# Patient Record
Sex: MALE | Race: ASIAN | NOT HISPANIC OR LATINO | URBAN - METROPOLITAN AREA
[De-identification: names, ages, dates, MRNs, and addresses within clinical notes are randomized per-mention and may not be internally consistent; named-entity substitution may affect disease eponyms.]

---

## 2017-03-06 ENCOUNTER — OUTPATIENT (OUTPATIENT)
Dept: OUTPATIENT SERVICES | Facility: HOSPITAL | Age: 52
LOS: 1 days | End: 2017-03-06

## 2017-03-06 ENCOUNTER — APPOINTMENT (OUTPATIENT)
Dept: INTERNAL MEDICINE | Facility: HOSPITAL | Age: 52
End: 2017-03-06

## 2017-03-06 VITALS — SYSTOLIC BLOOD PRESSURE: 110 MMHG | HEART RATE: 76 BPM | DIASTOLIC BLOOD PRESSURE: 80 MMHG

## 2017-03-06 VITALS — HEIGHT: 66 IN | BODY MASS INDEX: 27 KG/M2 | WEIGHT: 168 LBS

## 2017-03-07 DIAGNOSIS — E85.8 OTHER AMYLOIDOSIS: ICD-10-CM

## 2017-03-07 DIAGNOSIS — G47.00 INSOMNIA, UNSPECIFIED: ICD-10-CM

## 2017-03-07 DIAGNOSIS — E55.9 VITAMIN D DEFICIENCY, UNSPECIFIED: ICD-10-CM

## 2017-03-07 DIAGNOSIS — R14.1 GAS PAIN: ICD-10-CM

## 2017-03-07 DIAGNOSIS — K59.09 OTHER CONSTIPATION: ICD-10-CM

## 2017-03-08 DIAGNOSIS — M19.90 UNSPECIFIED OSTEOARTHRITIS, UNSPECIFIED SITE: ICD-10-CM

## 2017-06-19 ENCOUNTER — APPOINTMENT (OUTPATIENT)
Dept: INTERNAL MEDICINE | Facility: HOSPITAL | Age: 52
End: 2017-06-19

## 2017-06-19 ENCOUNTER — LABORATORY RESULT (OUTPATIENT)
Age: 52
End: 2017-06-19

## 2017-06-19 ENCOUNTER — OUTPATIENT (OUTPATIENT)
Dept: OUTPATIENT SERVICES | Facility: HOSPITAL | Age: 52
LOS: 1 days | End: 2017-06-19

## 2017-06-19 VITALS — WEIGHT: 167 LBS | HEIGHT: 66 IN | BODY MASS INDEX: 26.84 KG/M2

## 2017-06-19 DIAGNOSIS — Z82.49 FAMILY HISTORY OF ISCHEMIC HEART DISEASE AND OTHER DISEASES OF THE CIRCULATORY SYSTEM: ICD-10-CM

## 2017-06-19 LAB
CHOLEST SERPL-MCNC: 219 MG/DL — HIGH (ref 120–199)
CRP SERPL-MCNC: 2.8 MG/L — SIGNIFICANT CHANGE UP (ref 0.3–5)
HBA1C BLD-MCNC: 6.3 % — HIGH (ref 4–5.6)
HDLC SERPL-MCNC: 43 MG/DL — SIGNIFICANT CHANGE UP (ref 35–55)
LIPID PNL WITH DIRECT LDL SERPL: 165 MG/DL — SIGNIFICANT CHANGE UP
TRIGL SERPL-MCNC: 103 MG/DL — SIGNIFICANT CHANGE UP (ref 10–149)
TSH SERPL-MCNC: 1.01 UIU/ML — SIGNIFICANT CHANGE UP (ref 0.27–4.2)

## 2017-06-20 VITALS — SYSTOLIC BLOOD PRESSURE: 120 MMHG | DIASTOLIC BLOOD PRESSURE: 80 MMHG | HEART RATE: 80 BPM

## 2017-06-20 DIAGNOSIS — Z82.49 FAMILY HISTORY OF ISCHEMIC HEART DISEASE AND OTHER DISEASES OF THE CIRCULATORY SYSTEM: ICD-10-CM

## 2017-06-20 DIAGNOSIS — G47.00 INSOMNIA, UNSPECIFIED: ICD-10-CM

## 2017-06-20 DIAGNOSIS — R07.9 CHEST PAIN, UNSPECIFIED: ICD-10-CM

## 2017-06-20 DIAGNOSIS — K59.09 OTHER CONSTIPATION: ICD-10-CM

## 2017-06-20 DIAGNOSIS — R14.1 GAS PAIN: ICD-10-CM

## 2017-06-20 DIAGNOSIS — M19.90 UNSPECIFIED OSTEOARTHRITIS, UNSPECIFIED SITE: ICD-10-CM

## 2017-06-20 DIAGNOSIS — E55.9 VITAMIN D DEFICIENCY, UNSPECIFIED: ICD-10-CM

## 2017-06-20 DIAGNOSIS — E85.8 OTHER AMYLOIDOSIS: ICD-10-CM

## 2017-06-21 LAB — 24R-OH-CALCIDIOL SERPL-MCNC: 14.8 NG/ML — LOW (ref 30–100)

## 2017-07-05 ENCOUNTER — APPOINTMENT (OUTPATIENT)
Dept: CV DIAGNOSTICS | Facility: HOSPITAL | Age: 52
End: 2017-07-05

## 2017-08-28 ENCOUNTER — OUTPATIENT (OUTPATIENT)
Dept: OUTPATIENT SERVICES | Facility: HOSPITAL | Age: 52
LOS: 1 days | End: 2017-08-28

## 2017-08-28 ENCOUNTER — APPOINTMENT (OUTPATIENT)
Dept: INTERNAL MEDICINE | Facility: HOSPITAL | Age: 52
End: 2017-08-28
Payer: MEDICAID

## 2017-08-28 VITALS — SYSTOLIC BLOOD PRESSURE: 122 MMHG | DIASTOLIC BLOOD PRESSURE: 88 MMHG | HEART RATE: 68 BPM | OXYGEN SATURATION: 96 %

## 2017-08-28 VITALS — WEIGHT: 170 LBS | HEIGHT: 66 IN | BODY MASS INDEX: 27.32 KG/M2

## 2017-08-28 DIAGNOSIS — Z23 ENCOUNTER FOR IMMUNIZATION: ICD-10-CM

## 2017-08-28 DIAGNOSIS — E85.8: ICD-10-CM

## 2017-08-28 DIAGNOSIS — G47.00 INSOMNIA, UNSPECIFIED: ICD-10-CM

## 2017-08-28 PROCEDURE — 99214 OFFICE O/P EST MOD 30 MIN: CPT | Mod: GC

## 2017-08-30 DIAGNOSIS — R07.9 CHEST PAIN, UNSPECIFIED: ICD-10-CM

## 2017-08-30 DIAGNOSIS — R73.03 PREDIABETES: ICD-10-CM

## 2017-08-30 DIAGNOSIS — E85.8 OTHER AMYLOIDOSIS: ICD-10-CM

## 2017-08-30 DIAGNOSIS — G47.00 INSOMNIA, UNSPECIFIED: ICD-10-CM

## 2017-08-30 DIAGNOSIS — M19.90 UNSPECIFIED OSTEOARTHRITIS, UNSPECIFIED SITE: ICD-10-CM

## 2017-08-30 DIAGNOSIS — K59.09 OTHER CONSTIPATION: ICD-10-CM

## 2017-08-30 DIAGNOSIS — E55.9 VITAMIN D DEFICIENCY, UNSPECIFIED: ICD-10-CM

## 2017-09-14 ENCOUNTER — OUTPATIENT (OUTPATIENT)
Dept: OUTPATIENT SERVICES | Facility: HOSPITAL | Age: 52
LOS: 1 days | End: 2017-09-14

## 2017-09-14 ENCOUNTER — APPOINTMENT (OUTPATIENT)
Dept: OBGYN | Facility: HOSPITAL | Age: 52
End: 2017-09-14

## 2017-09-15 DIAGNOSIS — R73.03 PREDIABETES: ICD-10-CM

## 2017-12-11 ENCOUNTER — OUTPATIENT (OUTPATIENT)
Dept: OUTPATIENT SERVICES | Facility: HOSPITAL | Age: 52
LOS: 1 days | End: 2017-12-11

## 2017-12-11 ENCOUNTER — LABORATORY RESULT (OUTPATIENT)
Age: 52
End: 2017-12-11

## 2017-12-11 ENCOUNTER — APPOINTMENT (OUTPATIENT)
Dept: INTERNAL MEDICINE | Facility: HOSPITAL | Age: 52
End: 2017-12-11
Payer: MEDICAID

## 2017-12-11 VITALS — SYSTOLIC BLOOD PRESSURE: 128 MMHG | DIASTOLIC BLOOD PRESSURE: 80 MMHG | HEART RATE: 80 BPM

## 2017-12-11 VITALS — HEIGHT: 66 IN | WEIGHT: 168.54 LBS | BODY MASS INDEX: 27.09 KG/M2

## 2017-12-11 DIAGNOSIS — J30.2 OTHER SEASONAL ALLERGIC RHINITIS: ICD-10-CM

## 2017-12-11 DIAGNOSIS — Z87.898 PERSONAL HISTORY OF OTHER SPECIFIED CONDITIONS: ICD-10-CM

## 2017-12-11 LAB
APPEARANCE UR: CLEAR — SIGNIFICANT CHANGE UP
BILIRUB UR-MCNC: NEGATIVE — SIGNIFICANT CHANGE UP
BLOOD UR QL VISUAL: NEGATIVE — SIGNIFICANT CHANGE UP
CHOLEST SERPL-MCNC: 125 MG/DL — SIGNIFICANT CHANGE UP (ref 120–199)
COLOR SPEC: SIGNIFICANT CHANGE UP
GLUCOSE UR-MCNC: NEGATIVE — SIGNIFICANT CHANGE UP
HDLC SERPL-MCNC: 42 MG/DL — SIGNIFICANT CHANGE UP (ref 35–55)
KETONES UR-MCNC: NEGATIVE — SIGNIFICANT CHANGE UP
LEUKOCYTE ESTERASE UR-ACNC: NEGATIVE — SIGNIFICANT CHANGE UP
LIPID PNL WITH DIRECT LDL SERPL: 70 MG/DL — SIGNIFICANT CHANGE UP
MUCOUS THREADS # UR AUTO: SIGNIFICANT CHANGE UP
NITRITE UR-MCNC: NEGATIVE — SIGNIFICANT CHANGE UP
PH UR: 7 — SIGNIFICANT CHANGE UP (ref 4.6–8)
PROT UR-MCNC: NEGATIVE MG/DL — SIGNIFICANT CHANGE UP
SP GR SPEC: 1.01 — SIGNIFICANT CHANGE UP (ref 1–1.04)
TRIGL SERPL-MCNC: 101 MG/DL — SIGNIFICANT CHANGE UP (ref 10–149)
UROBILINOGEN FLD QL: NORMAL MG/DL — SIGNIFICANT CHANGE UP
WBC UR QL: SIGNIFICANT CHANGE UP (ref 0–?)

## 2017-12-11 PROCEDURE — 99213 OFFICE O/P EST LOW 20 MIN: CPT | Mod: GE

## 2017-12-15 DIAGNOSIS — R14.1 GAS PAIN: ICD-10-CM

## 2017-12-15 DIAGNOSIS — Z23 ENCOUNTER FOR IMMUNIZATION: ICD-10-CM

## 2017-12-15 DIAGNOSIS — J30.2 OTHER SEASONAL ALLERGIC RHINITIS: ICD-10-CM

## 2017-12-15 DIAGNOSIS — K59.09 OTHER CONSTIPATION: ICD-10-CM

## 2017-12-15 DIAGNOSIS — R35.0 FREQUENCY OF MICTURITION: ICD-10-CM

## 2017-12-15 DIAGNOSIS — E78.00 PURE HYPERCHOLESTEROLEMIA, UNSPECIFIED: ICD-10-CM

## 2018-01-05 ENCOUNTER — OTHER (OUTPATIENT)
Age: 53
End: 2018-01-05

## 2018-06-07 ENCOUNTER — OUTPATIENT (OUTPATIENT)
Dept: OUTPATIENT SERVICES | Facility: HOSPITAL | Age: 53
LOS: 1 days | End: 2018-06-07

## 2018-06-07 ENCOUNTER — LABORATORY RESULT (OUTPATIENT)
Age: 53
End: 2018-06-07

## 2018-06-07 ENCOUNTER — APPOINTMENT (OUTPATIENT)
Dept: INTERNAL MEDICINE | Facility: HOSPITAL | Age: 53
End: 2018-06-07
Payer: MEDICAID

## 2018-06-07 VITALS — DIASTOLIC BLOOD PRESSURE: 78 MMHG | SYSTOLIC BLOOD PRESSURE: 112 MMHG

## 2018-06-07 VITALS — HEIGHT: 66 IN | WEIGHT: 170 LBS | BODY MASS INDEX: 27.32 KG/M2

## 2018-06-07 DIAGNOSIS — E78.00 PURE HYPERCHOLESTEROLEMIA, UNSPECIFIED: ICD-10-CM

## 2018-06-07 LAB
BASOPHILS # BLD AUTO: 0.03 K/UL — SIGNIFICANT CHANGE UP (ref 0–0.2)
BASOPHILS NFR BLD AUTO: 0.6 % — SIGNIFICANT CHANGE UP (ref 0–2)
EOSINOPHIL # BLD AUTO: 0.55 K/UL — HIGH (ref 0–0.5)
EOSINOPHIL NFR BLD AUTO: 11.6 % — HIGH (ref 0–6)
HBA1C BLD-MCNC: 6.2 % — HIGH (ref 4–5.6)
HCT VFR BLD CALC: 42.1 % — SIGNIFICANT CHANGE UP (ref 39–50)
HGB BLD-MCNC: 13.8 G/DL — SIGNIFICANT CHANGE UP (ref 13–17)
IMM GRANULOCYTES # BLD AUTO: 0.01 # — SIGNIFICANT CHANGE UP
IMM GRANULOCYTES NFR BLD AUTO: 0.2 % — SIGNIFICANT CHANGE UP (ref 0–1.5)
LYMPHOCYTES # BLD AUTO: 1.76 K/UL — SIGNIFICANT CHANGE UP (ref 1–3.3)
LYMPHOCYTES # BLD AUTO: 37.1 % — SIGNIFICANT CHANGE UP (ref 13–44)
MCHC RBC-ENTMCNC: 27.9 PG — SIGNIFICANT CHANGE UP (ref 27–34)
MCHC RBC-ENTMCNC: 32.8 % — SIGNIFICANT CHANGE UP (ref 32–36)
MCV RBC AUTO: 85.1 FL — SIGNIFICANT CHANGE UP (ref 80–100)
MONOCYTES # BLD AUTO: 0.4 K/UL — SIGNIFICANT CHANGE UP (ref 0–0.9)
MONOCYTES NFR BLD AUTO: 8.4 % — SIGNIFICANT CHANGE UP (ref 2–14)
NEUTROPHILS # BLD AUTO: 1.99 K/UL — SIGNIFICANT CHANGE UP (ref 1.8–7.4)
NEUTROPHILS NFR BLD AUTO: 42.1 % — LOW (ref 43–77)
NRBC # FLD: 0 — SIGNIFICANT CHANGE UP
PLATELET # BLD AUTO: 227 K/UL — SIGNIFICANT CHANGE UP (ref 150–400)
PMV BLD: 10.2 FL — SIGNIFICANT CHANGE UP (ref 7–13)
RBC # BLD: 4.95 M/UL — SIGNIFICANT CHANGE UP (ref 4.2–5.8)
RBC # FLD: 12.3 % — SIGNIFICANT CHANGE UP (ref 10.3–14.5)
WBC # BLD: 4.74 K/UL — SIGNIFICANT CHANGE UP (ref 3.8–10.5)
WBC # FLD AUTO: 4.74 K/UL — SIGNIFICANT CHANGE UP (ref 3.8–10.5)

## 2018-06-07 PROCEDURE — 99214 OFFICE O/P EST MOD 30 MIN: CPT

## 2018-06-07 RX ORDER — CHROMIUM 200 MCG
1000 TABLET ORAL DAILY
Qty: 30 | Refills: 6 | Status: ACTIVE | COMMUNITY
Start: 2017-06-22 | End: 1900-01-01

## 2018-06-07 NOTE — HISTORY OF PRESENT ILLNESS
[FreeTextEntry1] : increased urination and need refills on medications [de-identified] : 51 yo M with pmhx of HLD, Constipation, presents with follow up for his urinary frequency. \par Pt claims he is urinating every two hours. He has not seen any blood or changes in color in the urine.  No pain or burning when urinating. No incontinence.  Patient also mentions he drinks lots of water throughout the day at work to stay hydrated.  Cannot estimate approximately how much he drinks. Pt denies suprapubic pain.  No dizziness, syncope, lightheadedness. No SOB or CP. \par Patient still has intermittent constipation and continues colace which helps. \par Patient is taking omeprazole PRN and does not want to try H2b because he claims he tried it before and did not help him. \par Pt says he watches his diet and doesn't understand why he gained 2 pounds.  \par Requests Urology referral.

## 2018-06-07 NOTE — REVIEW OF SYSTEMS
[Frequency] : frequency [Negative] : Heme/Lymph [Dysuria] : no dysuria [Incontinence] : no incontinence [Hesitancy] : no hesitancy [Nocturia] : no nocturia [Hematuria] : no hematuria

## 2018-06-07 NOTE — ASSESSMENT
[FreeTextEntry1] : 51 yo M with HLD, VIT D Def, presents with increased urination .\par \par Increased Urinary Frequency\par - Spoke to patient about starting Flomax. Patient prefers to get tested before starting any new medicines.  He is okay with the increased frequency symptom for now until he sees Urology\par - Urology referral given\par - Last U/A was negative for blood and infection\par \par HLD\par - LIpid panel last check WNL\par - Continue Lipitor and ASA\par \par Prediabetes\par - Checking Hgb A1C today\par \par Vit D Def\par - Continue Vitamin D supplements. \par \par Fatigue\par - Will start by checking CBC \par \par Heartburn/gas\par - Advised patient to try other products, but patient prefers to stick with PPI despite side effects explained\par - Gas symptoms revealed with some soda.  Told patient to try GasX OTC\par \par RTC after Urology appt or as needed\par \par Pt discussed with Dr. Watson.

## 2018-06-07 NOTE — HISTORY OF PRESENT ILLNESS
[FreeTextEntry1] : increased urination and need refills on medications [de-identified] : 51 yo M with pmhx of HLD, Constipation, presents with follow up for his urinary frequency. \par Pt claims he is urinating every two hours. He has not seen any blood or changes in color in the urine.  No pain or burning when urinating. No incontinence.  Patient also mentions he drinks lots of water throughout the day at work to stay hydrated.  Cannot estimate approximately how much he drinks. Pt denies suprapubic pain.  No dizziness, syncope, lightheadedness. No SOB or CP. \par Patient still has intermittent constipation and continues colace which helps. \par Patient is taking omeprazole PRN and does not want to try H2b because he claims he tried it before and did not help him. \par Pt says he watches his diet and doesn't understand why he gained 2 pounds.  \par Requests Urology referral.

## 2018-06-08 DIAGNOSIS — K59.09 OTHER CONSTIPATION: ICD-10-CM

## 2018-06-08 DIAGNOSIS — R35.0 FREQUENCY OF MICTURITION: ICD-10-CM

## 2018-06-08 DIAGNOSIS — E55.9 VITAMIN D DEFICIENCY, UNSPECIFIED: ICD-10-CM

## 2018-06-08 DIAGNOSIS — R53.83 OTHER FATIGUE: ICD-10-CM

## 2018-06-08 DIAGNOSIS — E78.00 PURE HYPERCHOLESTEROLEMIA, UNSPECIFIED: ICD-10-CM

## 2018-06-11 ENCOUNTER — APPOINTMENT (OUTPATIENT)
Dept: UROLOGY | Facility: CLINIC | Age: 53
End: 2018-06-11

## 2018-07-05 ENCOUNTER — EMERGENCY (EMERGENCY)
Facility: HOSPITAL | Age: 53
LOS: 1 days | Discharge: HOME | End: 2018-07-05
Attending: EMERGENCY MEDICINE | Admitting: EMERGENCY MEDICINE
Payer: MEDICAID

## 2018-07-05 VITALS
RESPIRATION RATE: 16 BRPM | HEART RATE: 69 BPM | DIASTOLIC BLOOD PRESSURE: 72 MMHG | TEMPERATURE: 98 F | OXYGEN SATURATION: 98 % | SYSTOLIC BLOOD PRESSURE: 126 MMHG

## 2018-07-05 PROCEDURE — 99284 EMERGENCY DEPT VISIT MOD MDM: CPT

## 2018-07-05 PROCEDURE — 73630 X-RAY EXAM OF FOOT: CPT | Mod: 26,LT

## 2018-07-05 RX ORDER — IBUPROFEN 200 MG
1 TABLET ORAL
Qty: 15 | Refills: 0 | OUTPATIENT
Start: 2018-07-05 | End: 2018-07-09

## 2018-07-05 NOTE — ED PROVIDER NOTE - PLAN OF CARE
Rest, ice and elevate your foot. Do not bear weight on your Left foot. Use crutches as demonstrated. Follow up with Podiatrist Dr. Gabriel [phone] call tomorrow to make an appointment. Continue all previously prescribed medications as directed.  Follow up with your primary care physician in 48-72 hours- bring copies of your results.  Return to the Emergency Department for worsening or persistent symptoms OR ANY NEW OR CONCERNING SYMPTOMS.

## 2018-07-05 NOTE — ED PROVIDER NOTE - CARE PLAN
Principal Discharge DX:	Foot pain, left  Assessment and plan of treatment:	Rest, ice and elevate your foot. Do not bear weight on your Left foot. Use crutches as demonstrated. Follow up with Podiatrist Dr. Gabriel [phone] call tomorrow to make an appointment. Continue all previously prescribed medications as directed.  Follow up with your primary care physician in 48-72 hours- bring copies of your results.  Return to the Emergency Department for worsening or persistent symptoms OR ANY NEW OR CONCERNING SYMPTOMS.

## 2018-07-05 NOTE — ED PROVIDER NOTE - OBJECTIVE STATEMENT
52y M c/o left sided foot pain after twisting ankle yesterday. No weakness, paresthesia, head trauma, or LOC. Pt took motrin and tylenol earlier today for pain 52y M c/o left sided foot pain after twisting ankle yesterday. No weakness, paresthesia, head trauma, or LOC. Pt took motrin and tylenol earlier today for pain but only had minimal relief. No back or neck pain.

## 2018-07-05 NOTE — ED PROVIDER NOTE - MUSCULOSKELETAL MINIMAL EXAM
No medial or lateral malleolus tenderness on left. Point tenderness over 5th metatarsal on left No medial or lateral malleolus tenderness on left. Point tenderness over 5th metatarsal on left. no weakness or paresthesias.

## 2018-07-05 NOTE — ED PROVIDER NOTE - PROGRESS NOTE DETAILS
CHARLIE Sanches: spoke to podiatry who reviewed xrays, can follow up with Dr. Gabriel, place in hard sole shoe and give crutches. ready for dc.

## 2018-07-13 NOTE — DISCUSSION/SUMMARY
[ER: _____] : ER: [unfilled] [FreeTextEntry1] : [employer] : [id number] Ada\par \par 52M with HLD, constipation, increased urinary frequency pending urology visit had visited ER after twisting left ankle and was advised to follow up with podiatry. Patient noted to have avulsion fracture and was provided with crutches. Pt did f/u with podiatry and was advised bed rest x 1 week. He has another f/u appointment and then he will return to work. He feels well otherwise. Left clinic phone number with patient should he wish to make medicine appointment as well. Reminded of urology appointment as well on 8/6 at 3:50 PM. \par \par NP PGY 3

## 2018-07-25 PROBLEM — J30.2 SEASONAL ALLERGIES: Status: ACTIVE | Noted: 2017-12-11

## 2018-08-06 ENCOUNTER — APPOINTMENT (OUTPATIENT)
Dept: UROLOGY | Facility: CLINIC | Age: 53
End: 2018-08-06
Payer: MEDICAID

## 2018-08-06 VITALS
DIASTOLIC BLOOD PRESSURE: 84 MMHG | WEIGHT: 173 LBS | HEIGHT: 66 IN | RESPIRATION RATE: 16 BRPM | TEMPERATURE: 97.6 F | HEART RATE: 88 BPM | SYSTOLIC BLOOD PRESSURE: 123 MMHG | BODY MASS INDEX: 27.8 KG/M2

## 2018-08-06 DIAGNOSIS — N40.1 BENIGN PROSTATIC HYPERPLASIA WITH LOWER URINARY TRACT SYMPMS: ICD-10-CM

## 2018-08-06 PROCEDURE — 99204 OFFICE O/P NEW MOD 45 MIN: CPT | Mod: 25

## 2018-08-06 PROCEDURE — 51798 US URINE CAPACITY MEASURE: CPT

## 2018-08-06 RX ORDER — TAMSULOSIN HYDROCHLORIDE 0.4 MG/1
0.4 CAPSULE ORAL
Qty: 90 | Refills: 3 | Status: ACTIVE | COMMUNITY
Start: 2018-08-06 | End: 1900-01-01

## 2018-08-06 RX ORDER — LORATADINE 10 MG/1
10 TABLET ORAL
Qty: 30 | Refills: 3 | Status: COMPLETED | COMMUNITY
Start: 2017-12-11 | End: 2018-08-06

## 2018-08-07 LAB — PSA SERPL-MCNC: 0.61 NG/ML

## 2018-08-10 LAB
APPEARANCE: CLEAR
BACTERIA UR CULT: NORMAL
BACTERIA: NEGATIVE
BILIRUBIN URINE: NEGATIVE
BLOOD URINE: NEGATIVE
CALCIUM OXALATE CRYSTALS: ABNORMAL
COLOR: YELLOW
GLUCOSE QUALITATIVE U: NEGATIVE MG/DL
HYALINE CASTS: 0 /LPF
KETONES URINE: NEGATIVE
LEUKOCYTE ESTERASE URINE: NEGATIVE
MICROSCOPIC-UA: NORMAL
NITRITE URINE: NEGATIVE
PH URINE: 5.5
PROTEIN URINE: 30 MG/DL
RED BLOOD CELLS URINE: 0 /HPF
SPECIFIC GRAVITY URINE: 1.02
SQUAMOUS EPITHELIAL CELLS: 0 /HPF
URINE COMMENTS: NORMAL
UROBILINOGEN URINE: NEGATIVE MG/DL
WHITE BLOOD CELLS URINE: 58 /HPF

## 2018-10-23 ENCOUNTER — LABORATORY RESULT (OUTPATIENT)
Age: 53
End: 2018-10-23

## 2018-10-23 ENCOUNTER — APPOINTMENT (OUTPATIENT)
Dept: INTERNAL MEDICINE | Facility: HOSPITAL | Age: 53
End: 2018-10-23
Payer: MEDICAID

## 2018-10-23 ENCOUNTER — OUTPATIENT (OUTPATIENT)
Dept: OUTPATIENT SERVICES | Facility: HOSPITAL | Age: 53
LOS: 1 days | End: 2018-10-23

## 2018-10-23 VITALS — SYSTOLIC BLOOD PRESSURE: 139 MMHG | DIASTOLIC BLOOD PRESSURE: 89 MMHG | HEART RATE: 80 BPM

## 2018-10-23 VITALS — HEIGHT: 66 IN | WEIGHT: 176 LBS | BODY MASS INDEX: 28.28 KG/M2

## 2018-10-23 DIAGNOSIS — Z87.898 PERSONAL HISTORY OF OTHER SPECIFIED CONDITIONS: ICD-10-CM

## 2018-10-23 DIAGNOSIS — M19.90 UNSPECIFIED OSTEOARTHRITIS, UNSPECIFIED SITE: ICD-10-CM

## 2018-10-23 DIAGNOSIS — R73.03 PREDIABETES.: ICD-10-CM

## 2018-10-23 DIAGNOSIS — K59.09 OTHER CONSTIPATION: ICD-10-CM

## 2018-10-23 DIAGNOSIS — Z23 ENCOUNTER FOR IMMUNIZATION: ICD-10-CM

## 2018-10-23 DIAGNOSIS — R14.1 GAS PAIN: ICD-10-CM

## 2018-10-23 DIAGNOSIS — Z86.03 PERSONAL HISTORY OF NEOPLASM OF UNCERTAIN BEHAVIOR: ICD-10-CM

## 2018-10-23 DIAGNOSIS — R35.0 FREQUENCY OF MICTURITION: ICD-10-CM

## 2018-10-23 DIAGNOSIS — Z87.448 PERSONAL HISTORY OF OTHER DISEASES OF URINARY SYSTEM: ICD-10-CM

## 2018-10-23 DIAGNOSIS — R39.13 SPLITTING OF URINARY STREAM: ICD-10-CM

## 2018-10-23 DIAGNOSIS — R21 RASH AND OTHER NONSPECIFIC SKIN ERUPTION: ICD-10-CM

## 2018-10-23 DIAGNOSIS — E55.9 VITAMIN D DEFICIENCY, UNSPECIFIED: ICD-10-CM

## 2018-10-23 DIAGNOSIS — Z87.2 PERSONAL HISTORY OF DISEASES OF THE SKIN AND SUBCUTANEOUS TISSUE: ICD-10-CM

## 2018-10-23 DIAGNOSIS — Z00.00 ENCOUNTER FOR GENERAL ADULT MEDICAL EXAMINATION W/OUT ABNORMAL FINDINGS: ICD-10-CM

## 2018-10-23 LAB
CHOLEST SERPL-MCNC: 115 MG/DL — LOW (ref 120–199)
HBA1C BLD-MCNC: 6.4 % — HIGH (ref 4–5.6)
HDLC SERPL-MCNC: 36 MG/DL — SIGNIFICANT CHANGE UP (ref 35–55)
LIPID PNL WITH DIRECT LDL SERPL: 61 MG/DL — SIGNIFICANT CHANGE UP
TRIGL SERPL-MCNC: 146 MG/DL — SIGNIFICANT CHANGE UP (ref 10–149)

## 2018-10-23 PROCEDURE — 99214 OFFICE O/P EST MOD 30 MIN: CPT | Mod: GC

## 2018-10-23 RX ORDER — ATORVASTATIN CALCIUM 20 MG/1
20 TABLET, FILM COATED ORAL DAILY
Qty: 90 | Refills: 2 | Status: ACTIVE | COMMUNITY
Start: 2017-08-28 | End: 1900-01-01

## 2018-10-23 RX ORDER — LORATADINE 10 MG
17 TABLET,DISINTEGRATING ORAL
Qty: 4 | Refills: 2 | Status: COMPLETED | COMMUNITY
Start: 2018-10-23 | End: 2019-07-20

## 2018-10-23 RX ORDER — ASPIRIN ENTERIC COATED TABLETS 81 MG 81 MG/1
81 TABLET, DELAYED RELEASE ORAL DAILY
Qty: 30 | Refills: 6 | Status: DISCONTINUED | COMMUNITY
Start: 2017-06-19 | End: 2018-10-23

## 2018-10-23 RX ORDER — ACETAMINOPHEN 500 MG/1
500 TABLET, COATED ORAL
Qty: 90 | Refills: 0 | Status: ACTIVE | COMMUNITY
Start: 2018-10-23 | End: 1900-01-01

## 2018-10-23 NOTE — COUNSELING
[Weight management counseling provided] : Weight management [Healthy eating counseling provided] : healthy eating [Activity counseling provided] : activity [Target Wt Loss Goal ___] : Target weight loss goal [unfilled] lbs [Weight Self Once Weekly] : Weight self once weekly [Decrease Portions] : Decrease food portions [Walking] : Walking [Good understanding] : Patient has a good understanding of lifestyle changes and the steps needed to achieve self management goals

## 2018-10-24 LAB
24R-OH-CALCIDIOL SERPL-MCNC: 20 NG/ML — LOW (ref 30–80)
CREAT UR-MCNC: 63 MG/DL — SIGNIFICANT CHANGE UP
MICROALBUMIN UR-MCNC: < 1.2 MG/DL — SIGNIFICANT CHANGE UP

## 2018-10-26 DIAGNOSIS — R73.03 PREDIABETES: ICD-10-CM

## 2018-10-26 DIAGNOSIS — E55.9 VITAMIN D DEFICIENCY, UNSPECIFIED: ICD-10-CM

## 2018-10-26 DIAGNOSIS — K59.09 OTHER CONSTIPATION: ICD-10-CM

## 2018-10-26 DIAGNOSIS — R35.0 FREQUENCY OF MICTURITION: ICD-10-CM

## 2018-10-26 NOTE — ASSESSMENT
[FreeTextEntry1] : 53M pmh HLD, constipation, urinary frequency, Vit D deficiency, artheritis, prediabetes coming in for continued urinary frequency and constipation.\par \par #Urinary Frequency\par Pt reports no change from using Flomax. It appears to be 2/2 increase water intake. Pt reports that it is due to water intake as well. PSA negative, U/A negative, A1C in prediabetic rage. \par -pt encouraged to return to Urology\par \par #Prediabetes\par Pt continues with lifestyle modifications and weightloss, however, has gained 3 lbs since last visit.\par -f/u A1C\par -f/u Urine Microalb/Cr ratio\par \par #Constipation\par Pt currently taking colace, however, still having episodes of constipation. Pt wished to know if it was safe for him to take Miralaxx as adjuvant therapy. \par -Add Miralax daily along with Colace\par \par # Vit D def\par Level has not been checked. Will check level.\par -F/u Vit D level, d/c Vit D if normalized\par \par #Arthritis\par Pt requested refill of Tylenol for winter months\par -c/w Tylenol 500mg Q6hr PRN\par \par #HCM\par -flu vaccine given today\par -RTC in 6 months\par \par Pt discussed with Dr Obrien. All risks and benefits were discussed with pt. Pt and attending has agreed with above assessment and plan. \par \par Michael Portillo MD PGY2\par Internal Medicine\par [facility]\par [phone] \par

## 2018-10-26 NOTE — HISTORY OF PRESENT ILLNESS
[Spouse] : spouse [[employer] ] : provided by [employer]   [FreeTextEntry1] : [id number] [de-identified] : 53M pmh HLD, constipation, urinary frequency coming in for continued urinary frequency and constipation. Pt not proficient in English, [employer]  used (no. [id number]).\par \par #Urinary Frequency\par Pt reports that it is related to the amount of water he drinks. He reports that he has been drinking about 7-8 cups of water a day and that he usually has to urinate half an hour afterwards. He denies dysuria, foul smell in urine. He reports only awaking 1 time during the night to urinate. He reports that his urologist started him on Flomax, however, this has made no difference in the symptoms. \par \par #Constipation\par Pt report that he has been taking Colace regularly, however, he still has occasional issues with constipation. He reports that his urologist suggested starting Miralaxx, however, he did not try it because he had not spoken to his primary doctor as of yet. \par \par #Vit D deficiency\par Pt reports that he was started on Vit D because of a low lever, however, he was never rechecked. \par \par #HCM \par Pt is due for flu shot today.

## 2021-06-04 ENCOUNTER — TRANSCRIPTION ENCOUNTER (OUTPATIENT)
Age: 56
End: 2021-06-04

## 2021-06-04 ENCOUNTER — INPATIENT (INPATIENT)
Facility: HOSPITAL | Age: 56
LOS: 0 days | Discharge: HOME | End: 2021-06-05
Attending: OTOLARYNGOLOGY | Admitting: OTOLARYNGOLOGY
Payer: COMMERCIAL

## 2021-06-04 VITALS
DIASTOLIC BLOOD PRESSURE: 71 MMHG | TEMPERATURE: 98 F | OXYGEN SATURATION: 100 % | SYSTOLIC BLOOD PRESSURE: 123 MMHG | RESPIRATION RATE: 16 BRPM | HEART RATE: 83 BPM

## 2021-06-04 DIAGNOSIS — T17.228A FOOD IN PHARYNX CAUSING OTHER INJURY, INITIAL ENCOUNTER: ICD-10-CM

## 2021-06-04 LAB
ALBUMIN SERPL ELPH-MCNC: 4.6 G/DL — SIGNIFICANT CHANGE UP (ref 3.3–5)
ALP SERPL-CCNC: 52 U/L — SIGNIFICANT CHANGE UP (ref 40–120)
ALT FLD-CCNC: 28 U/L — SIGNIFICANT CHANGE UP (ref 4–41)
ANION GAP SERPL CALC-SCNC: 9 MMOL/L — SIGNIFICANT CHANGE UP (ref 7–14)
APTT BLD: 32.1 SEC — SIGNIFICANT CHANGE UP (ref 27–36.3)
AST SERPL-CCNC: 26 U/L — SIGNIFICANT CHANGE UP (ref 4–40)
BASOPHILS # BLD AUTO: 0.04 K/UL — SIGNIFICANT CHANGE UP (ref 0–0.2)
BASOPHILS NFR BLD AUTO: 0.5 % — SIGNIFICANT CHANGE UP (ref 0–2)
BILIRUB SERPL-MCNC: 0.4 MG/DL — SIGNIFICANT CHANGE UP (ref 0.2–1.2)
BLD GP AB SCN SERPL QL: NEGATIVE — SIGNIFICANT CHANGE UP
BUN SERPL-MCNC: 12 MG/DL — SIGNIFICANT CHANGE UP (ref 7–23)
CALCIUM SERPL-MCNC: 9 MG/DL — SIGNIFICANT CHANGE UP (ref 8.4–10.5)
CHLORIDE SERPL-SCNC: 104 MMOL/L — SIGNIFICANT CHANGE UP (ref 98–107)
CO2 SERPL-SCNC: 27 MMOL/L — SIGNIFICANT CHANGE UP (ref 22–31)
CREAT SERPL-MCNC: 0.79 MG/DL — SIGNIFICANT CHANGE UP (ref 0.5–1.3)
EOSINOPHIL # BLD AUTO: 0.41 K/UL — SIGNIFICANT CHANGE UP (ref 0–0.5)
EOSINOPHIL NFR BLD AUTO: 5.6 % — SIGNIFICANT CHANGE UP (ref 0–6)
GLUCOSE SERPL-MCNC: 135 MG/DL — HIGH (ref 70–99)
HCT VFR BLD CALC: 41.8 % — SIGNIFICANT CHANGE UP (ref 39–50)
HGB BLD-MCNC: 13.8 G/DL — SIGNIFICANT CHANGE UP (ref 13–17)
IANC: 4.58 K/UL — SIGNIFICANT CHANGE UP (ref 1.5–8.5)
IMM GRANULOCYTES NFR BLD AUTO: 0.3 % — SIGNIFICANT CHANGE UP (ref 0–1.5)
INR BLD: 1.06 RATIO — SIGNIFICANT CHANGE UP (ref 0.88–1.16)
LYMPHOCYTES # BLD AUTO: 1.69 K/UL — SIGNIFICANT CHANGE UP (ref 1–3.3)
LYMPHOCYTES # BLD AUTO: 23.1 % — SIGNIFICANT CHANGE UP (ref 13–44)
MCHC RBC-ENTMCNC: 28.3 PG — SIGNIFICANT CHANGE UP (ref 27–34)
MCHC RBC-ENTMCNC: 33 GM/DL — SIGNIFICANT CHANGE UP (ref 32–36)
MCV RBC AUTO: 85.7 FL — SIGNIFICANT CHANGE UP (ref 80–100)
MONOCYTES # BLD AUTO: 0.57 K/UL — SIGNIFICANT CHANGE UP (ref 0–0.9)
MONOCYTES NFR BLD AUTO: 7.8 % — SIGNIFICANT CHANGE UP (ref 2–14)
NEUTROPHILS # BLD AUTO: 4.58 K/UL — SIGNIFICANT CHANGE UP (ref 1.8–7.4)
NEUTROPHILS NFR BLD AUTO: 62.7 % — SIGNIFICANT CHANGE UP (ref 43–77)
NRBC # BLD: 0 /100 WBCS — SIGNIFICANT CHANGE UP
NRBC # FLD: 0 K/UL — SIGNIFICANT CHANGE UP
PLATELET # BLD AUTO: 206 K/UL — SIGNIFICANT CHANGE UP (ref 150–400)
POTASSIUM SERPL-MCNC: 4.3 MMOL/L — SIGNIFICANT CHANGE UP (ref 3.5–5.3)
POTASSIUM SERPL-SCNC: 4.3 MMOL/L — SIGNIFICANT CHANGE UP (ref 3.5–5.3)
PROT SERPL-MCNC: 7.6 G/DL — SIGNIFICANT CHANGE UP (ref 6–8.3)
PROTHROM AB SERPL-ACNC: 12 SEC — SIGNIFICANT CHANGE UP (ref 10.6–13.6)
RBC # BLD: 4.88 M/UL — SIGNIFICANT CHANGE UP (ref 4.2–5.8)
RBC # FLD: 12.8 % — SIGNIFICANT CHANGE UP (ref 10.3–14.5)
RH IG SCN BLD-IMP: POSITIVE — SIGNIFICANT CHANGE UP
RH IG SCN BLD-IMP: POSITIVE — SIGNIFICANT CHANGE UP
SODIUM SERPL-SCNC: 140 MMOL/L — SIGNIFICANT CHANGE UP (ref 135–145)
WBC # BLD: 7.31 K/UL — SIGNIFICANT CHANGE UP (ref 3.8–10.5)
WBC # FLD AUTO: 7.31 K/UL — SIGNIFICANT CHANGE UP (ref 3.8–10.5)

## 2021-06-04 PROCEDURE — 99284 EMERGENCY DEPT VISIT MOD MDM: CPT

## 2021-06-04 PROCEDURE — 70490 CT SOFT TISSUE NECK W/O DYE: CPT | Mod: 26

## 2021-06-04 RX ORDER — DEXAMETHASONE 0.5 MG/5ML
10 ELIXIR ORAL ONCE
Refills: 0 | Status: COMPLETED | OUTPATIENT
Start: 2021-06-04 | End: 2021-06-04

## 2021-06-04 RX ORDER — LIDOCAINE HCL 20 MG/ML
4 VIAL (ML) INJECTION ONCE
Refills: 0 | Status: COMPLETED | OUTPATIENT
Start: 2021-06-04 | End: 2021-06-04

## 2021-06-04 RX ORDER — LIDOCAINE 4 G/100G
10 CREAM TOPICAL ONCE
Refills: 0 | Status: COMPLETED | OUTPATIENT
Start: 2021-06-04 | End: 2021-06-04

## 2021-06-04 RX ADMIN — LIDOCAINE 10 MILLILITER(S): 4 CREAM TOPICAL at 17:12

## 2021-06-04 RX ADMIN — Medication 102 MILLIGRAM(S): at 19:08

## 2021-06-04 NOTE — ED ADULT NURSE REASSESSMENT NOTE - NS ED NURSE REASSESS COMMENT FT1
patient  Alert & ox3.No complains of pain or discomfort noted at this time. Placed 20g angiocath Lt. AC., labs drawn and sent. Patient is comfortable , patient will be waiting for results ,CT scan and  further evaluation and disposition.  made comfortable.will continue to monitor.

## 2021-06-04 NOTE — ED ADULT TRIAGE NOTE - CHIEF COMPLAINT QUOTE
states" I got a fish bone stuck in my throat since yesterday " speaking clearly in full sentences . denies any pain to swallow.

## 2021-06-04 NOTE — ED ADULT NURSE NOTE - OBJECTIVE STATEMENT
Pt. is A&Ox3 presents to ER c/o fish bone stuck in his throat since yesterday. No drooling noted, respirations even and unlabored, able to speak in full sentences. ENT at bedside for evaluation. Medicated as ordered.

## 2021-06-04 NOTE — ED PROVIDER NOTE - PROGRESS NOTE DETAILS
Patient seen by ENT; performed bedside scope and saw FB, but unable to remove  ENT requests CT    -md garth

## 2021-06-04 NOTE — ED PROVIDER NOTE - OBJECTIVE STATEMENT
54 yo M with PMHX of DM , HTN, HLD here with complaint of fish bone in throat x 1 day, last night ate fish, tried to gargle with no relief, unable to get bone out.   Denies fever, chills, vomiting ,difficulty breathing/ swallowing, drooling.  Patient is tolerating secretions.   Denies cp ,sob, abdominal pain.

## 2021-06-04 NOTE — ED ADULT NURSE REASSESSMENT NOTE - NS ED NURSE REASSESS COMMENT FT1
report given to OR. pt belonging secured across room 21.  valuable secured with security. patient and family member made aware of plan of care.

## 2021-06-04 NOTE — ED PROVIDER NOTE - CLINICAL SUMMARY MEDICAL DECISION MAKING FREE TEXT BOX
56 yo M with PMHX of DM , HTN, HLD here with complaint of fish bone in throat x 1 day, last night ate fish, tried to gargle with no relief, unable to get bone out.  ent attempt to remove, will get CT.

## 2021-06-04 NOTE — CONSULT NOTE ADULT - SUBJECTIVE AND OBJECTIVE BOX
ENT consulted for fishbone removal from upper aerodigestive tract. Ate fish yesterday. no airway symp.       T(C): 36.6 (06-04-21 @ 17:12), Max: 36.8 (06-04-21 @ 11:28)  HR: 77 (06-04-21 @ 17:12) (77 - 84)  BP: 210/86 (06-04-21 @ 17:12) (161/89 - 210/86)  RR: 16 (06-04-21 @ 17:12) (15 - 16)  SpO2: 99% (06-04-21 @ 17:12) (99% - 100%)    FOE:  FB at first appreciated in right laryngeal wall lateral to arytenoid. After persistent evaluation no longer appreciated.     Procedure: Direct laryngoscopy  After topical anesthetic was applied, a DL was attempted. Patient did not tolerate procedure 2/2 gag reflex and procedure aborted.       A/P:   55M with FB sensation in throat after eating fish yesterday  -NPO  -decadron 10 mg x1  -CT neck non con  -please page ENT with wet read when available

## 2021-06-04 NOTE — ED ADULT NURSE REASSESSMENT NOTE - NS ED NURSE REASSESS COMMENT FT1
Pt received to CDU bed 7. Pt A&Ox3, ambulatory, received with 20G intravenous access to LAC. pt still c/o feeling of fish bone in throat when swallowing. denies dyspnea, no respiratory distress noted. Side rails up, bed at lowest position, call bell within reach, patient oriented to the unit, safety maintained.

## 2021-06-04 NOTE — ED PROVIDER NOTE - ATTENDING CONTRIBUTION TO CARE
56 yo male c/o FB in throat  patient was eating fish, and feels a bone is stuck  tolerating po  no vomiting  nad  normal voice  tolerating secreations  cta b/l, [id number]  abd soft, non tender    FB in throat, ENT eval

## 2021-06-05 VITALS
OXYGEN SATURATION: 100 % | RESPIRATION RATE: 19 BRPM | SYSTOLIC BLOOD PRESSURE: 125 MMHG | DIASTOLIC BLOOD PRESSURE: 71 MMHG | HEART RATE: 79 BPM

## 2021-06-05 LAB — SARS-COV-2 RNA SPEC QL NAA+PROBE: SIGNIFICANT CHANGE UP

## 2021-06-05 RX ORDER — ONDANSETRON 8 MG/1
4 TABLET, FILM COATED ORAL ONCE
Refills: 0 | Status: DISCONTINUED | OUTPATIENT
Start: 2021-06-05 | End: 2021-06-05

## 2021-06-05 RX ORDER — FENTANYL CITRATE 50 UG/ML
25 INJECTION INTRAVENOUS
Refills: 0 | Status: DISCONTINUED | OUTPATIENT
Start: 2021-06-05 | End: 2021-06-05

## 2021-06-05 RX ORDER — BENZOCAINE AND MENTHOL 5; 1 G/100ML; G/100ML
1 LIQUID ORAL
Refills: 0 | Status: DISCONTINUED | OUTPATIENT
Start: 2021-06-05 | End: 2021-06-05

## 2021-06-05 RX ADMIN — BENZOCAINE AND MENTHOL 1 LOZENGE: 5; 1 LIQUID ORAL at 03:29

## 2021-06-05 RX ADMIN — FENTANYL CITRATE 25 MICROGRAM(S): 50 INJECTION INTRAVENOUS at 03:30

## 2021-06-05 RX ADMIN — FENTANYL CITRATE 25 MICROGRAM(S): 50 INJECTION INTRAVENOUS at 03:16

## 2021-06-05 NOTE — ASU DISCHARGE PLAN (ADULT/PEDIATRIC) - ASU DC SPECIAL INSTRUCTIONSFT
no follow up needed. Return to ED if symptoms worsen     throat lozenge for any discomfort    return to normal diet

## 2021-06-18 ENCOUNTER — INPATIENT (INPATIENT)
Facility: HOSPITAL | Age: 56
LOS: 0 days | Discharge: HOME | End: 2021-06-19
Attending: INTERNAL MEDICINE | Admitting: INTERNAL MEDICINE
Payer: COMMERCIAL

## 2021-06-18 VITALS
TEMPERATURE: 100 F | RESPIRATION RATE: 16 BRPM | SYSTOLIC BLOOD PRESSURE: 126 MMHG | OXYGEN SATURATION: 99 % | DIASTOLIC BLOOD PRESSURE: 82 MMHG | HEART RATE: 86 BPM

## 2021-06-18 DIAGNOSIS — K56.609 UNSPECIFIED INTESTINAL OBSTRUCTION, UNSPECIFIED AS TO PARTIAL VERSUS COMPLETE OBSTRUCTION: ICD-10-CM

## 2021-06-18 PROBLEM — E11.9 TYPE 2 DIABETES MELLITUS WITHOUT COMPLICATIONS: Chronic | Status: ACTIVE | Noted: 2021-06-04

## 2021-06-18 PROBLEM — I10 ESSENTIAL (PRIMARY) HYPERTENSION: Chronic | Status: ACTIVE | Noted: 2021-06-04

## 2021-06-18 PROBLEM — E78.5 HYPERLIPIDEMIA, UNSPECIFIED: Chronic | Status: ACTIVE | Noted: 2021-06-04

## 2021-06-18 LAB
ALBUMIN SERPL ELPH-MCNC: 4.1 G/DL — SIGNIFICANT CHANGE UP (ref 3.3–5)
ALP SERPL-CCNC: 54 U/L — SIGNIFICANT CHANGE UP (ref 40–120)
ALT FLD-CCNC: 113 U/L — HIGH (ref 4–41)
ANION GAP SERPL CALC-SCNC: 9 MMOL/L — SIGNIFICANT CHANGE UP (ref 7–14)
APPEARANCE UR: CLEAR — SIGNIFICANT CHANGE UP
APTT BLD: 26.1 SEC — LOW (ref 27–36.3)
AST SERPL-CCNC: 64 U/L — HIGH (ref 4–40)
BACTERIA # UR AUTO: NEGATIVE — SIGNIFICANT CHANGE UP
BASE EXCESS BLDV CALC-SCNC: 4.4 MMOL/L — HIGH (ref -3–2)
BASOPHILS # BLD AUTO: 0.01 K/UL — SIGNIFICANT CHANGE UP (ref 0–0.2)
BASOPHILS NFR BLD AUTO: 0.1 % — SIGNIFICANT CHANGE UP (ref 0–2)
BILIRUB SERPL-MCNC: 1 MG/DL — SIGNIFICANT CHANGE UP (ref 0.2–1.2)
BILIRUB UR-MCNC: NEGATIVE — SIGNIFICANT CHANGE UP
BLD GP AB SCN SERPL QL: NEGATIVE — SIGNIFICANT CHANGE UP
BLOOD GAS VENOUS - CREATININE: 0.9 MG/DL — SIGNIFICANT CHANGE UP (ref 0.5–1.3)
BLOOD GAS VENOUS COMPREHENSIVE RESULT: SIGNIFICANT CHANGE UP
BUN SERPL-MCNC: 11 MG/DL — SIGNIFICANT CHANGE UP (ref 7–23)
CALCIUM SERPL-MCNC: 9.3 MG/DL — SIGNIFICANT CHANGE UP (ref 8.4–10.5)
CHLORIDE BLDV-SCNC: 106 MMOL/L — SIGNIFICANT CHANGE UP (ref 96–108)
CHLORIDE SERPL-SCNC: 103 MMOL/L — SIGNIFICANT CHANGE UP (ref 98–107)
CO2 SERPL-SCNC: 25 MMOL/L — SIGNIFICANT CHANGE UP (ref 22–31)
COLOR SPEC: YELLOW — SIGNIFICANT CHANGE UP
CREAT SERPL-MCNC: 0.83 MG/DL — SIGNIFICANT CHANGE UP (ref 0.5–1.3)
DIFF PNL FLD: ABNORMAL
EOSINOPHIL # BLD AUTO: 0.22 K/UL — SIGNIFICANT CHANGE UP (ref 0–0.5)
EOSINOPHIL NFR BLD AUTO: 2.4 % — SIGNIFICANT CHANGE UP (ref 0–6)
EPI CELLS # UR: 0 /HPF — SIGNIFICANT CHANGE UP (ref 0–5)
GAS PNL BLDV: 137 MMOL/L — SIGNIFICANT CHANGE UP (ref 136–146)
GLUCOSE BLDV-MCNC: 102 MG/DL — HIGH (ref 70–99)
GLUCOSE SERPL-MCNC: 99 MG/DL — SIGNIFICANT CHANGE UP (ref 70–99)
GLUCOSE UR QL: NEGATIVE — SIGNIFICANT CHANGE UP
HCO3 BLDV-SCNC: 26 MMOL/L — SIGNIFICANT CHANGE UP (ref 20–27)
HCT VFR BLD CALC: 43.2 % — SIGNIFICANT CHANGE UP (ref 39–50)
HCT VFR BLDA CALC: 44.6 % — SIGNIFICANT CHANGE UP (ref 39–51)
HGB BLD CALC-MCNC: 14.5 G/DL — SIGNIFICANT CHANGE UP (ref 13–17)
HGB BLD-MCNC: 13.7 G/DL — SIGNIFICANT CHANGE UP (ref 13–17)
HYALINE CASTS # UR AUTO: 1 /LPF — SIGNIFICANT CHANGE UP (ref 0–7)
IANC: 6.6 K/UL — SIGNIFICANT CHANGE UP (ref 1.5–8.5)
IMM GRANULOCYTES NFR BLD AUTO: 0.2 % — SIGNIFICANT CHANGE UP (ref 0–1.5)
INR BLD: 1.26 RATIO — HIGH (ref 0.88–1.16)
KETONES UR-MCNC: NEGATIVE — SIGNIFICANT CHANGE UP
LACTATE BLDV-MCNC: 1.7 MMOL/L — SIGNIFICANT CHANGE UP (ref 0.5–2)
LEUKOCYTE ESTERASE UR-ACNC: NEGATIVE — SIGNIFICANT CHANGE UP
LIDOCAIN IGE QN: 42 U/L — SIGNIFICANT CHANGE UP (ref 7–60)
LYMPHOCYTES # BLD AUTO: 1.59 K/UL — SIGNIFICANT CHANGE UP (ref 1–3.3)
LYMPHOCYTES # BLD AUTO: 17.2 % — SIGNIFICANT CHANGE UP (ref 13–44)
MCHC RBC-ENTMCNC: 27.7 PG — SIGNIFICANT CHANGE UP (ref 27–34)
MCHC RBC-ENTMCNC: 31.7 GM/DL — LOW (ref 32–36)
MCV RBC AUTO: 87.4 FL — SIGNIFICANT CHANGE UP (ref 80–100)
MONOCYTES # BLD AUTO: 0.83 K/UL — SIGNIFICANT CHANGE UP (ref 0–0.9)
MONOCYTES NFR BLD AUTO: 9 % — SIGNIFICANT CHANGE UP (ref 2–14)
NEUTROPHILS # BLD AUTO: 6.6 K/UL — SIGNIFICANT CHANGE UP (ref 1.8–7.4)
NEUTROPHILS NFR BLD AUTO: 71.1 % — SIGNIFICANT CHANGE UP (ref 43–77)
NITRITE UR-MCNC: NEGATIVE — SIGNIFICANT CHANGE UP
NRBC # BLD: 0 /100 WBCS — SIGNIFICANT CHANGE UP
NRBC # FLD: 0 K/UL — SIGNIFICANT CHANGE UP
PCO2 BLDV: 57 MMHG — HIGH (ref 41–51)
PH BLDV: 7.34 — SIGNIFICANT CHANGE UP (ref 7.32–7.43)
PH UR: 7 — SIGNIFICANT CHANGE UP (ref 5–8)
PLATELET # BLD AUTO: 201 K/UL — SIGNIFICANT CHANGE UP (ref 150–400)
PO2 BLDV: <24 MMHG — LOW (ref 35–40)
POTASSIUM BLDV-SCNC: 3.7 MMOL/L — SIGNIFICANT CHANGE UP (ref 3.4–4.5)
POTASSIUM SERPL-MCNC: 4 MMOL/L — SIGNIFICANT CHANGE UP (ref 3.5–5.3)
POTASSIUM SERPL-SCNC: 4 MMOL/L — SIGNIFICANT CHANGE UP (ref 3.5–5.3)
PROT SERPL-MCNC: 7.1 G/DL — SIGNIFICANT CHANGE UP (ref 6–8.3)
PROT UR-MCNC: ABNORMAL
PROTHROM AB SERPL-ACNC: 14.2 SEC — HIGH (ref 10.6–13.6)
RBC # BLD: 4.94 M/UL — SIGNIFICANT CHANGE UP (ref 4.2–5.8)
RBC # FLD: 12.5 % — SIGNIFICANT CHANGE UP (ref 10.3–14.5)
RBC CASTS # UR COMP ASSIST: 4 /HPF — SIGNIFICANT CHANGE UP (ref 0–4)
RH IG SCN BLD-IMP: POSITIVE — SIGNIFICANT CHANGE UP
SAO2 % BLDV: 37.4 % — LOW (ref 60–85)
SARS-COV-2 RNA SPEC QL NAA+PROBE: SIGNIFICANT CHANGE UP
SODIUM SERPL-SCNC: 137 MMOL/L — SIGNIFICANT CHANGE UP (ref 135–145)
SP GR SPEC: >1.05 (ref 1.01–1.02)
TROPONIN T, HIGH SENSITIVITY RESULT: 8 NG/L — SIGNIFICANT CHANGE UP
UROBILINOGEN FLD QL: SIGNIFICANT CHANGE UP
WBC # BLD: 9.27 K/UL — SIGNIFICANT CHANGE UP (ref 3.8–10.5)
WBC # FLD AUTO: 9.27 K/UL — SIGNIFICANT CHANGE UP (ref 3.8–10.5)
WBC UR QL: 0 /HPF — SIGNIFICANT CHANGE UP (ref 0–5)

## 2021-06-18 PROCEDURE — 74177 CT ABD & PELVIS W/CONTRAST: CPT | Mod: 26

## 2021-06-18 PROCEDURE — 76705 ECHO EXAM OF ABDOMEN: CPT | Mod: 26

## 2021-06-18 PROCEDURE — 99285 EMERGENCY DEPT VISIT HI MDM: CPT

## 2021-06-18 PROCEDURE — 99222 1ST HOSP IP/OBS MODERATE 55: CPT | Mod: GC

## 2021-06-18 RX ORDER — KETOROLAC TROMETHAMINE 30 MG/ML
15 SYRINGE (ML) INJECTION ONCE
Refills: 0 | Status: DISCONTINUED | OUTPATIENT
Start: 2021-06-18 | End: 2021-06-18

## 2021-06-18 RX ORDER — SODIUM CHLORIDE 9 MG/ML
1000 INJECTION INTRAMUSCULAR; INTRAVENOUS; SUBCUTANEOUS ONCE
Refills: 0 | Status: COMPLETED | OUTPATIENT
Start: 2021-06-18 | End: 2021-06-18

## 2021-06-18 RX ORDER — SODIUM CHLORIDE 9 MG/ML
1000 INJECTION, SOLUTION INTRAVENOUS
Refills: 0 | Status: DISCONTINUED | OUTPATIENT
Start: 2021-06-18 | End: 2021-06-19

## 2021-06-18 RX ORDER — GLUCAGON INJECTION, SOLUTION 0.5 MG/.1ML
1 INJECTION, SOLUTION SUBCUTANEOUS ONCE
Refills: 0 | Status: DISCONTINUED | OUTPATIENT
Start: 2021-06-18 | End: 2021-06-19

## 2021-06-18 RX ORDER — CEFOTETAN DISODIUM 1 G
2 VIAL (EA) INJECTION ONCE
Refills: 0 | Status: COMPLETED | OUTPATIENT
Start: 2021-06-18 | End: 2021-06-18

## 2021-06-18 RX ORDER — ENOXAPARIN SODIUM 100 MG/ML
40 INJECTION SUBCUTANEOUS DAILY
Refills: 0 | Status: DISCONTINUED | OUTPATIENT
Start: 2021-06-18 | End: 2021-06-19

## 2021-06-18 RX ORDER — ONDANSETRON 8 MG/1
4 TABLET, FILM COATED ORAL ONCE
Refills: 0 | Status: COMPLETED | OUTPATIENT
Start: 2021-06-18 | End: 2021-06-18

## 2021-06-18 RX ORDER — ACETAMINOPHEN 500 MG
650 TABLET ORAL ONCE
Refills: 0 | Status: COMPLETED | OUTPATIENT
Start: 2021-06-18 | End: 2021-06-18

## 2021-06-18 RX ORDER — FAMOTIDINE 10 MG/ML
20 INJECTION INTRAVENOUS ONCE
Refills: 0 | Status: COMPLETED | OUTPATIENT
Start: 2021-06-18 | End: 2021-06-18

## 2021-06-18 RX ADMIN — Medication 650 MILLIGRAM(S): at 13:28

## 2021-06-18 RX ADMIN — SODIUM CHLORIDE 1000 MILLILITER(S): 9 INJECTION INTRAMUSCULAR; INTRAVENOUS; SUBCUTANEOUS at 09:50

## 2021-06-18 RX ADMIN — Medication 30 MILLILITER(S): at 09:59

## 2021-06-18 RX ADMIN — SODIUM CHLORIDE 125 MILLILITER(S): 9 INJECTION, SOLUTION INTRAVENOUS at 18:11

## 2021-06-18 RX ADMIN — Medication 100 GRAM(S): at 23:15

## 2021-06-18 RX ADMIN — FAMOTIDINE 20 MILLIGRAM(S): 10 INJECTION INTRAVENOUS at 09:55

## 2021-06-18 RX ADMIN — Medication 15 MILLIGRAM(S): at 13:28

## 2021-06-18 RX ADMIN — ONDANSETRON 4 MILLIGRAM(S): 8 TABLET, FILM COATED ORAL at 09:51

## 2021-06-18 NOTE — H&P ADULT - HISTORY OF PRESENT ILLNESS
55M w/ HTN, HLD, DM, but no PSH, p/w generalized abdominal pain for few days accompanied by n/v and diarrhea. Pt's son is a pharmacist who prompted him to present to ED. Of note pt was in ED 1wk ago due to fish bone that was stuck in the esophagus, ENT performed DL and removed the bone w/o issues. Pt states he has no FH of cancer, received colonoscopy 7 yrs ago w/o significant findings, no known hx of abd hernia. Pt states he is passing flatus every 10minutes at baseline and has not changed, has been having BM daily also without any changes, most recently this AM, but yesterday had multiple bouts of diarrhea.    In ED vitals wnl stable, exam showing soft NT but distended abdomen. Labs w/o significant abnormality, and CT abd/pelv showing SBO w/ single transition point in R hemiabdomen. 55M w/ HTN, HLD, DM (takes Metformin but does not recall dose) but no PSH, p/w generalized abdominal pain for few days accompanied by n/v and diarrhea. Pt's son is a pharmacist who prompted him to present to ED. Of note pt was in ED 1wk ago due to fish bone that was stuck in the esophagus, ENT performed DL and removed the bone w/o issues. Pt states he has no FH of cancer, received colonoscopy 7 yrs ago w/o significant findings, no known hx of abd hernia. Pt states he is passing flatus every 10minutes at baseline and has not changed, has been having BM daily also without any changes, most recently this AM, but yesterday had multiple bouts of diarrhea.    In ED vitals wnl stable, exam showing soft NT but distended abdomen. Labs w/o significant abnormality, and CT abd/pelv showing SBO w/ single transition point in R hemiabdomen.

## 2021-06-18 NOTE — H&P ADULT - NSHPPHYSICALEXAM_GEN_ALL_CORE
General: NAD, Sitting in bed comfortably  HEENT: NC/AT, EOMI  Neck: Soft, supple  Cardio: RRR, nml S1/S2  Resp: Good effort, CTA b/l  GI/Abd: Soft, NT, moderately distended, no rebound/guarding, no masses palpated  Skin: Intact, no breakdown  Lymphatic/Nodes: No palpable lymphadenopathy  Musculoskeletal: All 4 extremities moving spontaneously, no limitations

## 2021-06-18 NOTE — ED PROVIDER NOTE - PROGRESS NOTE DETAILS
Deshawn Byrd MD: CTAP demonstrates SBO - pt reports sx improved after meds and is passing gas, consulted surgery, will bring patient back to a room for further eval and possible NGT

## 2021-06-18 NOTE — H&P ADULT - ATTENDING COMMENTS
I saw and examined the patient and agree with the above note.    K56.50 Small Bowel Obstruction, primary in nature  -NPO/IVF  -Resuscitation and trend labs and urine output   -Pain control via PRN IV meds  -Small bowel follow through  -Operative indications include prolonged SBO, increasing pain/generalized peritonitis and/or concerning repeat imaging.  -will need repeat colonoscopy and capsule enteroscopy as outpatient if resolves as underlying malignancy is a possibility     I spent 55min reviewing data, images and documentation as well as in care coordination.  Greater than 50% of my time was spent in discussion regarding pre- and post-operative care as well as risk and benefits of operative intervention.    Andre Lo MD   Acute and Critical Care Surgery  (Cell: [phone])  Email: [email or web address]    The Acute Care Surgery (B Team) Attending Group Practice:  Dr. Mathew Elliott, Dr. Rogelio Shirley, Dr. Pippa Tyson, Dr. Andre Lo    Urgent issues - spectra [phone] or [phone]  Nonurgent issues - [phone]  Patient appointments or after hours - [phone]

## 2021-06-18 NOTE — ED PROVIDER NOTE - CLINICAL SUMMARY MEDICAL DECISION MAKING FREE TEXT BOX
55M p/w abd pain, n/v - ddx includes pancreatitis, biliary pathology, will r/o ACS, other intra-abd pathology, check CTAP, give sx relief - reassess, dispo accordingly

## 2021-06-18 NOTE — ED PROVIDER NOTE - PHYSICAL EXAMINATION
*GEN:   in no acute distress, AOx3  *EYES:   pupils equally round and reactive to light, extra-occular movements intact  *HEENT:   airway patent  *CV:   regular rate and rhythm  *RESP:   clear to auscultation bilaterally, non-labored  *ABD:   generalized tenderness to palpation worse over epigastrum  *:   no cva/flank tenderness  *EXTREM:   no MSK tenderness, full ROM throughout, no leg swelling  *SKIN:   dry, intact  *NEURO:   AOx3, no focal weakness or loss of sensation

## 2021-06-18 NOTE — H&P ADULT - NSHPLABSRESULTS_GEN_ALL_CORE
Vital Signs Last 24 Hrs  T(C): 36.8 (18 Jun 2021 15:42), Max: 37.6 (18 Jun 2021 08:24)  T(F): 98.2 (18 Jun 2021 15:42), Max: 99.6 (18 Jun 2021 08:24)  HR: 73 (18 Jun 2021 15:42) (73 - 89)  BP: 112/74 (18 Jun 2021 15:42) (112/74 - 126/82)  BP(mean): --  RR: 16 (18 Jun 2021 15:42) (16 - 16)  SpO2: 98% (18 Jun 2021 15:42) (98% - 100%)      LABS:                        13.7   9.27  )-----------( 201      ( 18 Jun 2021 11:08 )             43.2     06-18    137  |  103  |  11  ----------------------------<  99  4.0   |  25  |  0.83    Ca    9.3      18 Jun 2021 11:08    TPro  7.1  /  Alb  4.1  /  TBili  1.0  /  DBili  x   /  AST  64<H>  /  ALT  113<H>  /  AlkPhos  54  06-18      Urinalysis Basic - ( 18 Jun 2021 15:59 )    Color: Yellow / Appearance: Clear / SG: >1.050 / pH: x  Gluc: x / Ketone: Negative  / Bili: Negative / Urobili: <2 mg/dL   Blood: x / Protein: Trace / Nitrite: Negative   Leuk Esterase: Negative / RBC: 4 /HPF / WBC 0 /HPF   Sq Epi: x / Non Sq Epi: 0 /HPF / Bacteria: Negative        INs and OUTs:    IMAGING:    < from: CT Abdomen and Pelvis w/ IV Cont (06.18.21 @ 13:55) >    EXAM:  CT ABDOMEN AND PELVIS IC        PROCEDURE DATE:  Jun 18 2021 BOWEL: Dilated proximal small bowel loops, inclusive of a couple fecalized loops, with transition in the right hemiabdomen (2:72) with distal collapsed small bowel, compatible with small bowel obstruction. Small air and stool throughout the colon. Scattered colonic diverticula without acute diverticulitis. Appendix is normal.    ABDOMINAL WALL: A small fat-containing right inguinal hernia    IMPRESSION:  Small bowel obstruction with transition point in the right mid abdomen, as above.    < end of copied text >

## 2021-06-18 NOTE — ED ADULT NURSE NOTE - OBJECTIVE STATEMENT
56 y/o male presents to ED with 1 day hx of n/v/d, denies fever chills.  Pt well appearing in NAD.  Iv established, labs drawn and sent, pt medu\icated as per order, family member at bedside

## 2021-06-18 NOTE — ED PROVIDER NOTE - OBJECTIVE STATEMENT
55M w/ pmh HTN, HLD, DM - p/w generalized abd pain since last night, burning, worse in epigastrum, assc w/ multiple episodes n/v. No cough, sob, fever, dizziness, dysuria/hematuria.

## 2021-06-18 NOTE — H&P ADULT - ASSESSMENT
55M w/o any PSH presenting w/ abd pain n/v, but clinically nonobstructed, and CT findings consistent w/ primary SBO    - Admit to B Team Surgery, Dr. Lo  - Added on and consented for diagnostic laparoscopy today  - COVID pending  - Preop abx given, NPO/IVF, no need for NGT for now  - Discussed w/ Dr. Wally Arreguin PGY-3  B Team  [id number] 55M w/o any PSH presenting w/ abd pain n/v, but clinically nonobstructed, and CT findings consistent w/ primary SBO    - Admit to B Team Surgery, Dr. Lo  - will trend abdominal exams. May need a diagnostic laparoscopy if his distension does not improve  - possibly enteritis but not much inflammation seen  - COVID pending  - Preop abx given, NPO/IVF, no need for NGT for now  - Discussed w/ Dr. Wally Arreguin PGY-3  B Team  [id number]

## 2021-06-19 ENCOUNTER — TRANSCRIPTION ENCOUNTER (OUTPATIENT)
Age: 56
End: 2021-06-19

## 2021-06-19 VITALS
TEMPERATURE: 98 F | SYSTOLIC BLOOD PRESSURE: 109 MMHG | RESPIRATION RATE: 18 BRPM | DIASTOLIC BLOOD PRESSURE: 68 MMHG | OXYGEN SATURATION: 100 % | HEART RATE: 72 BPM

## 2021-06-19 LAB
A1C WITH ESTIMATED AVERAGE GLUCOSE RESULT: 6.2 % — HIGH (ref 4–5.6)
ANION GAP SERPL CALC-SCNC: 11 MMOL/L — SIGNIFICANT CHANGE UP (ref 7–14)
BUN SERPL-MCNC: 9 MG/DL — SIGNIFICANT CHANGE UP (ref 7–23)
CALCIUM SERPL-MCNC: 8.8 MG/DL — SIGNIFICANT CHANGE UP (ref 8.4–10.5)
CHLORIDE SERPL-SCNC: 103 MMOL/L — SIGNIFICANT CHANGE UP (ref 98–107)
CO2 SERPL-SCNC: 26 MMOL/L — SIGNIFICANT CHANGE UP (ref 22–31)
COVID-19 SPIKE DOMAIN AB INTERP: POSITIVE
COVID-19 SPIKE DOMAIN ANTIBODY RESULT: >250 U/ML — HIGH
CREAT SERPL-MCNC: 0.84 MG/DL — SIGNIFICANT CHANGE UP (ref 0.5–1.3)
CULTURE RESULTS: SIGNIFICANT CHANGE UP
ESTIMATED AVERAGE GLUCOSE: 131 MG/DL — HIGH (ref 68–114)
GLUCOSE SERPL-MCNC: 95 MG/DL — SIGNIFICANT CHANGE UP (ref 70–99)
HCT VFR BLD CALC: 39.6 % — SIGNIFICANT CHANGE UP (ref 39–50)
HCV AB S/CO SERPL IA: 0.08 S/CO — SIGNIFICANT CHANGE UP (ref 0–0.99)
HCV AB SERPL-IMP: SIGNIFICANT CHANGE UP
HGB BLD-MCNC: 12.9 G/DL — LOW (ref 13–17)
MAGNESIUM SERPL-MCNC: 2 MG/DL — SIGNIFICANT CHANGE UP (ref 1.6–2.6)
MCHC RBC-ENTMCNC: 28.2 PG — SIGNIFICANT CHANGE UP (ref 27–34)
MCHC RBC-ENTMCNC: 32.6 GM/DL — SIGNIFICANT CHANGE UP (ref 32–36)
MCV RBC AUTO: 86.7 FL — SIGNIFICANT CHANGE UP (ref 80–100)
NRBC # BLD: 0 /100 WBCS — SIGNIFICANT CHANGE UP
NRBC # FLD: 0 K/UL — SIGNIFICANT CHANGE UP
PHOSPHATE SERPL-MCNC: 2.2 MG/DL — LOW (ref 2.5–4.5)
PLATELET # BLD AUTO: 190 K/UL — SIGNIFICANT CHANGE UP (ref 150–400)
POTASSIUM SERPL-MCNC: 3.6 MMOL/L — SIGNIFICANT CHANGE UP (ref 3.5–5.3)
POTASSIUM SERPL-SCNC: 3.6 MMOL/L — SIGNIFICANT CHANGE UP (ref 3.5–5.3)
RBC # BLD: 4.57 M/UL — SIGNIFICANT CHANGE UP (ref 4.2–5.8)
RBC # FLD: 12.3 % — SIGNIFICANT CHANGE UP (ref 10.3–14.5)
SARS-COV-2 IGG+IGM SERPL QL IA: >250 U/ML — HIGH
SARS-COV-2 IGG+IGM SERPL QL IA: POSITIVE
SODIUM SERPL-SCNC: 140 MMOL/L — SIGNIFICANT CHANGE UP (ref 135–145)
SPECIMEN SOURCE: SIGNIFICANT CHANGE UP
WBC # BLD: 5.97 K/UL — SIGNIFICANT CHANGE UP (ref 3.8–10.5)
WBC # FLD AUTO: 5.97 K/UL — SIGNIFICANT CHANGE UP (ref 3.8–10.5)

## 2021-06-19 PROCEDURE — 99231 SBSQ HOSP IP/OBS SF/LOW 25: CPT | Mod: GC

## 2021-06-19 RX ORDER — POTASSIUM CHLORIDE 20 MEQ
20 PACKET (EA) ORAL ONCE
Refills: 0 | Status: COMPLETED | OUTPATIENT
Start: 2021-06-19 | End: 2021-06-19

## 2021-06-19 RX ORDER — POTASSIUM PHOSPHATE, MONOBASIC POTASSIUM PHOSPHATE, DIBASIC 236; 224 MG/ML; MG/ML
15 INJECTION, SOLUTION INTRAVENOUS ONCE
Refills: 0 | Status: COMPLETED | OUTPATIENT
Start: 2021-06-19 | End: 2021-06-19

## 2021-06-19 RX ADMIN — ENOXAPARIN SODIUM 40 MILLIGRAM(S): 100 INJECTION SUBCUTANEOUS at 12:04

## 2021-06-19 RX ADMIN — POTASSIUM PHOSPHATE, MONOBASIC POTASSIUM PHOSPHATE, DIBASIC 62.5 MILLIMOLE(S): 236; 224 INJECTION, SOLUTION INTRAVENOUS at 12:05

## 2021-06-19 RX ADMIN — Medication 20 MILLIEQUIVALENT(S): at 12:05

## 2021-06-19 RX ADMIN — SODIUM CHLORIDE 125 MILLILITER(S): 9 INJECTION, SOLUTION INTRAVENOUS at 05:54

## 2021-06-19 NOTE — PROGRESS NOTE ADULT - SUBJECTIVE AND OBJECTIVE BOX
HPI:  P/w primary SBO    24h Events:   - Overnight, no acute events    Subjective:   Patient examined at bedside this AM. Reports     Objective:  Vital Signs  T(C): 36.8 (06-18 @ 23:49), Max: 37.6 (06-18 @ 08:24)  HR: 71 (06-18 @ 23:49) (68 - 89)  BP: 126/84 (06-18 @ 23:49) (112/74 - 126/84)  RR: 18 (06-18 @ 23:49) (16 - 18)  SpO2: 100% (06-18 @ 23:49) (98% - 100%)    Physical Exam:  General: alert and oriented, NAD  Resp: airway patent, respirations unlabored  CVS: regular rate and rhythm  Abdomen: soft, nontender, moderately distended  Extremities: no edema  Skin: warm, dry, appropriate color    Labs:                        13.7   9.27  )-----------( 201      ( 18 Jun 2021 11:08 )             43.2   06-18    137  |  103  |  11  ----------------------------<  99  4.0   |  25  |  0.83    Ca    9.3      18 Jun 2021 11:08    TPro  7.1  /  Alb  4.1  /  TBili  1.0  /  DBili  x   /  AST  64<H>  /  ALT  113<H>  /  AlkPhos  54  06-18    CAPILLARY BLOOD GLUCOSE      POCT Blood Glucose.: 125 mg/dL (18 Jun 2021 08:29)      Microbiology:      Medications:   MEDICATIONS  (STANDING):  dextrose 5%. 1000 milliLiter(s) (100 mL/Hr) IV Continuous <Continuous>  enoxaparin Injectable 40 milliGRAM(s) SubCutaneous daily  glucagon  Injectable 1 milliGRAM(s) IntraMuscular once  lactated ringers. 1000 milliLiter(s) (125 mL/Hr) IV Continuous <Continuous>    MEDICATIONS  (PRN):       HPI:  P/w primary SBO    24h Events:   - Advanced to CLD in evening  - Overnight, no acute events    Subjective:   Patient examined at bedside this AM. Reports feeling much better today, still feeling a little bloated. Has been passing gas and having BMs. Tolerated po without N/V or pain    Objective:  Vital Signs Last 24 Hrs  T(C): 36.7 (19 Jun 2021 05:52), Max: 37.1 (18 Jun 2021 12:22)  T(F): 98.1 (19 Jun 2021 05:52), Max: 98.8 (18 Jun 2021 19:02)  HR: 67 (19 Jun 2021 05:52) (67 - 89)  BP: 108/60 (19 Jun 2021 05:52) (108/60 - 126/84)  BP(mean): --  RR: 18 (19 Jun 2021 05:52) (16 - 18)  SpO2: 94% (19 Jun 2021 05:52) (94% - 100%)      Physical Exam:  General: alert and oriented, NAD  Resp: airway patent, respirations unlabored  CVS: regular rate and rhythm  Abdomen: soft, nontender, moderately distended        LABS:                      12.9   5.97  )-----------( 190      ( 19 Jun 2021 08:23 )             39.6     06-19    140  |  103  |  9   ----------------------------<  95  3.6   |  26  |  0.84    Ca    8.8      19 Jun 2021 08:23  Phos  2.2     06-19  Mg     2.0     06-19    TPro  7.1  /  Alb  4.1  /  TBili  1.0  /  DBili  x   /  AST  64<H>  /  ALT  113<H>  /  AlkPhos  54  06-18    PT/INR - ( 18 Jun 2021 18:15 )   PT: 14.2 sec;   INR: 1.26 ratio    PTT - ( 18 Jun 2021 18:15 )  PTT:26.1 sec      Medications:   MEDICATIONS  (STANDING):  dextrose 5%. 1000 milliLiter(s) (100 mL/Hr) IV Continuous <Continuous>  enoxaparin Injectable 40 milliGRAM(s) SubCutaneous daily  glucagon  Injectable 1 milliGRAM(s) IntraMuscular once  lactated ringers. 1000 milliLiter(s) (125 mL/Hr) IV Continuous <Continuous>    MEDICATIONS  (PRN):

## 2021-06-19 NOTE — DISCHARGE NOTE PROVIDER - HOSPITAL COURSE
55M w/ HTN, HLD, DM (takes Metformin but does not recall dose) but no PSH, p/w generalized abdominal pain for few days accompanied by n/v and diarrhea. Pt's son is a pharmacist who prompted him to present to ED. Of note pt was in ED 1wk ago due to fish bone that was stuck in the esophagus, ENT performed DL and removed the bone w/o issues. Pt states he has no FH of cancer, received colonoscopy 7 yrs ago w/o significant findings, no known hx of abd hernia. Pt states he is passing flatus every 10minutes at baseline and has not changed, has been having BM daily also without any changes, most recently AM of admission, recently had multiple bouts of diarrhea. Patient was admitted and planned for OR however began opening up again. Given CLD and tolerated without N/V or worsening abdominal pain. On HD 1 patient was advanced to regular diet. Patient tolerated diet without further issue and continued having bowel function and was deemed appropriate for discharge with outpatient follow up.

## 2021-06-19 NOTE — DISCHARGE NOTE PROVIDER - CARE PROVIDER_API CALL
Andre Lo)  Surgery; Surgical Critical Care  [address], [address]  [place] [address]  Phone: [phone]  Fax: [phone]  Follow Up Time: Routine

## 2021-06-19 NOTE — PROGRESS NOTE ADULT - ASSESSMENT
55M w/o any PSH presenting w/ abd pain n/v, but clinically nonobstructed, and CT findings consistent w/ primary SBO    Plan  - Will trend abdominal exams. May need a diagnostic laparoscopy if his distension does not improve  - CLD/IVF, no need for NGT for now  - Exam before pain meds  - IS/OOB  - DVT ppx  - AM  labs    B Team  [id number] 55M w/o any PSH presenting w/ abd pain n/v, but clinically nonobstructed, and CT findings consistent w/ primary SBO    Plan  - Monitor abdominal distension  - Exam before pain meds  - Advance to regular  - IS/OOB  - DVT ppx  - AM  labs    B Team  [id number]

## 2021-06-19 NOTE — DISCHARGE NOTE NURSING/CASE MANAGEMENT/SOCIAL WORK - PATIENT PORTAL LINK FT
You can access the FollowMyHealth Patient Portal offered by [facility] by registering at the following website: [email or web address]. By joining [facility]’s FollowMyHealth portal, you will also be able to view your health information using other applications (apps) compatible with our system.

## 2021-06-19 NOTE — DISCHARGE NOTE PROVIDER - NSDCFUADDINST_GEN_ALL_CORE_FT
You should schedule an appointment with your primary care provider within 1 week of discharge for a posthospital appointment. 
No

## 2021-06-19 NOTE — DISCHARGE NOTE PROVIDER - NSDCCPCAREPLAN_GEN_ALL_CORE_FT
PRINCIPAL DISCHARGE DIAGNOSIS  Diagnosis: SBO (small bowel obstruction)  Assessment and Plan of Treatment: Treated non-operatively and clinically recovered.

## 2021-06-29 ENCOUNTER — APPOINTMENT (OUTPATIENT)
Dept: TRAUMA SURGERY | Facility: HOSPITAL | Age: 56
End: 2021-06-29
Payer: MEDICAID

## 2021-06-29 VITALS
TEMPERATURE: 98.2 F | SYSTOLIC BLOOD PRESSURE: 135 MMHG | DIASTOLIC BLOOD PRESSURE: 75 MMHG | BODY MASS INDEX: 26.2 KG/M2 | HEART RATE: 75 BPM | HEIGHT: 66 IN | WEIGHT: 163 LBS

## 2021-06-29 PROCEDURE — 99213 OFFICE O/P EST LOW 20 MIN: CPT

## 2021-07-14 NOTE — HISTORY OF PRESENT ILLNESS
[de-identified] : Mrs. Richey was briefly admitted for concern of bowel perforation and obstruction. After in-depth discussion of operative risks and benefits as well as radiology review, we agreed to treat him nonoperatively. He recovered well and was discharged. He follows up today to discuss further care.

## 2021-07-14 NOTE — PLAN
[FreeTextEntry1] : He has a follow up appointment for chronic constipation with GI\par -recommend capsule endoscopy at that time vs push enteroscopy to rule out underlying malignancy \par -low fiber diet with hydration \par -no acute surgical needs currently\par \par I spent 25min reviewing data, images and information. Greater than 50% of my time was spent in face to face discussion regarding follow up for GI concern.s \par \par Andre Lo MD\par Acute Care Surgery\par

## 2022-03-01 NOTE — ED ADULT NURSE NOTE - HIV OFFER
Opt out Mart-1 - Positive Histology Text: MART-1 staining demonstrates areas of higher density and clustering of melanocytes with Pagetoid spread upwards within the epidermis. The surgical margins are positive for tumor cells.

## 2023-05-12 ENCOUNTER — EMERGENCY (EMERGENCY)
Facility: HOSPITAL | Age: 58
LOS: 1 days | Discharge: HOME | End: 2023-05-12
Attending: EMERGENCY MEDICINE | Admitting: EMERGENCY MEDICINE
Payer: COMMERCIAL

## 2023-05-12 VITALS
TEMPERATURE: 98 F | HEART RATE: 20 BPM | RESPIRATION RATE: 20 BRPM | SYSTOLIC BLOOD PRESSURE: 130 MMHG | OXYGEN SATURATION: 99 % | DIASTOLIC BLOOD PRESSURE: 80 MMHG

## 2023-05-12 VITALS
DIASTOLIC BLOOD PRESSURE: 78 MMHG | SYSTOLIC BLOOD PRESSURE: 132 MMHG | RESPIRATION RATE: 16 BRPM | HEART RATE: 86 BPM | OXYGEN SATURATION: 100 % | TEMPERATURE: 98 F

## 2023-05-12 LAB
ALBUMIN SERPL ELPH-MCNC: 4.3 G/DL — SIGNIFICANT CHANGE UP (ref 3.3–5)
ALP SERPL-CCNC: 54 U/L — SIGNIFICANT CHANGE UP (ref 40–120)
ALT FLD-CCNC: 27 U/L — SIGNIFICANT CHANGE UP (ref 4–41)
ANION GAP SERPL CALC-SCNC: 12 MMOL/L — SIGNIFICANT CHANGE UP (ref 7–14)
AST SERPL-CCNC: 23 U/L — SIGNIFICANT CHANGE UP (ref 4–40)
BASOPHILS # BLD AUTO: 0.01 K/UL — SIGNIFICANT CHANGE UP (ref 0–0.2)
BASOPHILS NFR BLD AUTO: 0.1 % — SIGNIFICANT CHANGE UP (ref 0–2)
BILIRUB SERPL-MCNC: 0.9 MG/DL — SIGNIFICANT CHANGE UP (ref 0.2–1.2)
BUN SERPL-MCNC: 14 MG/DL — SIGNIFICANT CHANGE UP (ref 7–23)
CALCIUM SERPL-MCNC: 9.5 MG/DL — SIGNIFICANT CHANGE UP (ref 8.4–10.5)
CHLORIDE SERPL-SCNC: 100 MMOL/L — SIGNIFICANT CHANGE UP (ref 98–107)
CO2 SERPL-SCNC: 25 MMOL/L — SIGNIFICANT CHANGE UP (ref 22–31)
CREAT SERPL-MCNC: 0.8 MG/DL — SIGNIFICANT CHANGE UP (ref 0.5–1.3)
EGFR: 103 ML/MIN/1.73M2 — SIGNIFICANT CHANGE UP
EOSINOPHIL # BLD AUTO: 0.08 K/UL — SIGNIFICANT CHANGE UP (ref 0–0.5)
EOSINOPHIL NFR BLD AUTO: 1 % — SIGNIFICANT CHANGE UP (ref 0–6)
GLUCOSE SERPL-MCNC: 154 MG/DL — HIGH (ref 70–99)
HCT VFR BLD CALC: 42.1 % — SIGNIFICANT CHANGE UP (ref 39–50)
HGB BLD-MCNC: 13.8 G/DL — SIGNIFICANT CHANGE UP (ref 13–17)
IANC: 6.43 K/UL — SIGNIFICANT CHANGE UP (ref 1.8–7.4)
IMM GRANULOCYTES NFR BLD AUTO: 0.1 % — SIGNIFICANT CHANGE UP (ref 0–0.9)
LIDOCAIN IGE QN: 26 U/L — SIGNIFICANT CHANGE UP (ref 7–60)
LYMPHOCYTES # BLD AUTO: 0.87 K/UL — LOW (ref 1–3.3)
LYMPHOCYTES # BLD AUTO: 11.1 % — LOW (ref 13–44)
MCHC RBC-ENTMCNC: 27.3 PG — SIGNIFICANT CHANGE UP (ref 27–34)
MCHC RBC-ENTMCNC: 32.8 GM/DL — SIGNIFICANT CHANGE UP (ref 32–36)
MCV RBC AUTO: 83.4 FL — SIGNIFICANT CHANGE UP (ref 80–100)
MONOCYTES # BLD AUTO: 0.46 K/UL — SIGNIFICANT CHANGE UP (ref 0–0.9)
MONOCYTES NFR BLD AUTO: 5.9 % — SIGNIFICANT CHANGE UP (ref 2–14)
NEUTROPHILS # BLD AUTO: 6.43 K/UL — SIGNIFICANT CHANGE UP (ref 1.8–7.4)
NEUTROPHILS NFR BLD AUTO: 81.8 % — HIGH (ref 43–77)
NRBC # BLD: 0 /100 WBCS — SIGNIFICANT CHANGE UP (ref 0–0)
NRBC # FLD: 0 K/UL — SIGNIFICANT CHANGE UP (ref 0–0)
PLATELET # BLD AUTO: 219 K/UL — SIGNIFICANT CHANGE UP (ref 150–400)
POTASSIUM SERPL-MCNC: 4 MMOL/L — SIGNIFICANT CHANGE UP (ref 3.5–5.3)
POTASSIUM SERPL-SCNC: 4 MMOL/L — SIGNIFICANT CHANGE UP (ref 3.5–5.3)
PROT SERPL-MCNC: 7.7 G/DL — SIGNIFICANT CHANGE UP (ref 6–8.3)
RBC # BLD: 5.05 M/UL — SIGNIFICANT CHANGE UP (ref 4.2–5.8)
RBC # FLD: 12.4 % — SIGNIFICANT CHANGE UP (ref 10.3–14.5)
SODIUM SERPL-SCNC: 137 MMOL/L — SIGNIFICANT CHANGE UP (ref 135–145)
WBC # BLD: 7.86 K/UL — SIGNIFICANT CHANGE UP (ref 3.8–10.5)
WBC # FLD AUTO: 7.86 K/UL — SIGNIFICANT CHANGE UP (ref 3.8–10.5)

## 2023-05-12 PROCEDURE — 74177 CT ABD & PELVIS W/CONTRAST: CPT | Mod: 26,MA

## 2023-05-12 PROCEDURE — 93010 ELECTROCARDIOGRAM REPORT: CPT

## 2023-05-12 PROCEDURE — 99284 EMERGENCY DEPT VISIT MOD MDM: CPT

## 2023-05-12 RX ORDER — ACETAMINOPHEN 500 MG
975 TABLET ORAL ONCE
Refills: 0 | Status: COMPLETED | OUTPATIENT
Start: 2023-05-12 | End: 2023-05-12

## 2023-05-12 RX ORDER — METOCLOPRAMIDE HCL 10 MG
10 TABLET ORAL ONCE
Refills: 0 | Status: COMPLETED | OUTPATIENT
Start: 2023-05-12 | End: 2023-05-12

## 2023-05-12 RX ORDER — SODIUM CHLORIDE 9 MG/ML
1000 INJECTION, SOLUTION INTRAVENOUS ONCE
Refills: 0 | Status: COMPLETED | OUTPATIENT
Start: 2023-05-12 | End: 2023-05-12

## 2023-05-12 RX ADMIN — Medication 975 MILLIGRAM(S): at 14:56

## 2023-05-12 RX ADMIN — SODIUM CHLORIDE 1000 MILLILITER(S): 9 INJECTION, SOLUTION INTRAVENOUS at 15:03

## 2023-05-12 RX ADMIN — Medication 10 MILLIGRAM(S): at 14:56

## 2023-05-12 NOTE — ED PROVIDER NOTE - NSFOLLOWUPINSTRUCTIONS_ED_ALL_ED_FT
The CT scan of your abdomen did not show any evidence of a bowel obstruction, only some inflammation of the small bowel.  This is likely in the setting of your gastroenteritis caused by a stomach virus. It is important that you stay hydrated, adhere to simple diet of bananas, applesauce, rice, toast.  Please follow-up with your gastroenterologist, being copy of reports of your blood work and CT scan from this visit.  Please return to the emergency department immediately if you have worsening pain or vomiting.

## 2023-05-12 NOTE — ED ADULT NURSE NOTE - NSFALLUNIVINTERV_ED_ALL_ED
Bed/Stretcher in lowest position, wheels locked, appropriate side rails in place/Call bell, personal items and telephone in reach/Instruct patient to call for assistance before getting out of bed/chair/stretcher/Non-slip footwear applied when patient is off stretcher/[place] to call system/Physically safe environment - no spills, clutter or unnecessary equipment/Purposeful proactive rounding/Room/bathroom lighting operational, light cord in reach

## 2023-05-12 NOTE — ED PROVIDER NOTE - CLINICAL SUMMARY MEDICAL DECISION MAKING FREE TEXT BOX
Abdominal distention associate with decreased appetite.  Although the patient has no surgical history he has had a bowel obstruction in the past and the symptoms are consistent with bowel obstruction.  Will treat symptomatically and get a CT scan to evaluate for possible obstruction or other inflammatory intra-abdominal pathology.

## 2023-05-12 NOTE — ED ADULT NURSE NOTE - NS ED NOTE ABUSE SUSPICION NEGLECT YN
----- Message from Ana Flaherty MD sent at 4/1/2019  2:11 PM CDT -----  Has osteopenia, recommend calcium 600 mg bid with vit d 4000 iu daily, plus weight bearing exercise.  
Patient informed of bone density test results and recommendations.  Patient verbalizes understanding and denies questions at this time.   
No

## 2023-05-12 NOTE — ED PROVIDER NOTE - PATIENT PORTAL LINK FT
You can access the FollowMyHealth Patient Portal offered by [facility] by registering at the following website: [email or web address]. By joining [facility]’s FollowMyHealth portal, you will also be able to view your health information using other applications (apps) compatible with our system.

## 2023-05-12 NOTE — ED PROVIDER NOTE - OBJECTIVE STATEMENT
[age]end[age]-year-old male with history of hypertension and small bowel obstruction managed nonoperatively 2 years ago presents with abdominal pain, distention, and decreased appetite.  Symptoms began approximately 2 days ago when he developed abdominal distention and diarrhea.  He also had decreased appetite but no vomiting.  Today he tolerated breakfast but then developed abdominal distention.  His last bowel movement was yesterday.  He has no vomiting but he has no appetite either.  He states that this feels like when he had his bowel obstruction 2 years ago.  He has no past surgical history.  No fever or chills.  He has no discrete pain but says that his abdomen feels uncomfortable.

## 2023-05-12 NOTE — ED ADULT NURSE REASSESSMENT NOTE - NS ED NURSE REASSESS COMMENT FT1
Patient resting in stretcher A&Ox4, ambulatory denies complaints of chest pain, SOB, N,V,D dizziness at this time.  RR equal and unlabored. VS stable.  Awaiting results. Care plan continued. Comfort measures provided. Safety maintained.

## 2023-05-12 NOTE — ED PROVIDER NOTE - PROGRESS NOTE DETAILS
ID [id number] Tony (rikki) : Jaci: pt signed out to me from Dr. Vazquez pending CT scan. I reviewed CT- no e/o SBO, only enteritis. Pt was able to tolerate PO in ED w/o pain or nausea/vomiting. I reassessed him with  and went over his results and return precautions. Pt is comfortable with d/c, all questions answered, will f/u with his GI

## 2023-05-12 NOTE — ED ADULT NURSE REASSESSMENT NOTE - NS ED NURSE REASSESS COMMENT FT1
Patient resting in stretcher passed PO challenge. No nausea vomiting or abdominal pain. A&Ox4, ambulatory accompanied by family member RR equal and unlabored. Safety maintained.

## 2023-05-12 NOTE — ED ADULT NURSE NOTE - OBJECTIVE STATEMENT
Pt is alert and orientedx4, ambulatory at baseline. Pt presents to the ED with abdominal pain, HX of bowel obstruction. Pt denies chest pain, shortness of breath, dyspnea on exertion, breathing is unlabored and even. Pt denies nausea, vomiting or diarrhea.

## 2023-11-27 NOTE — DISCHARGE NOTE PROVIDER - NSRESEARCHGRANT_MLMHIDDEN_GEN_A_CORE
Patient was seen in OV with AB. Notified patient very limited availability, and that we will call back to schedule once availability opens with Dr. Swanson in April. Patient agreeable with that.      Colon for hx of colitis.  Spring/Summer 2024  
yes
